# Patient Record
Sex: MALE | Race: WHITE | NOT HISPANIC OR LATINO | URBAN - METROPOLITAN AREA
[De-identification: names, ages, dates, MRNs, and addresses within clinical notes are randomized per-mention and may not be internally consistent; named-entity substitution may affect disease eponyms.]

---

## 2017-03-17 ENCOUNTER — OUTPATIENT (OUTPATIENT)
Dept: OUTPATIENT SERVICES | Facility: HOSPITAL | Age: 52
LOS: 1 days | Discharge: HOME | End: 2017-03-17

## 2017-06-27 DIAGNOSIS — D57.1 SICKLE-CELL DISEASE WITHOUT CRISIS: ICD-10-CM

## 2017-06-27 DIAGNOSIS — E11.9 TYPE 2 DIABETES MELLITUS WITHOUT COMPLICATIONS: ICD-10-CM

## 2017-06-27 DIAGNOSIS — Z12.5 ENCOUNTER FOR SCREENING FOR MALIGNANT NEOPLASM OF PROSTATE: ICD-10-CM

## 2017-06-27 DIAGNOSIS — M06.9 RHEUMATOID ARTHRITIS, UNSPECIFIED: ICD-10-CM

## 2017-06-27 DIAGNOSIS — D64.9 ANEMIA, UNSPECIFIED: ICD-10-CM

## 2017-06-27 DIAGNOSIS — Z01.84 ENCOUNTER FOR ANTIBODY RESPONSE EXAMINATION: ICD-10-CM

## 2017-06-27 DIAGNOSIS — N39.0 URINARY TRACT INFECTION, SITE NOT SPECIFIED: ICD-10-CM

## 2017-06-27 DIAGNOSIS — E55.9 VITAMIN D DEFICIENCY, UNSPECIFIED: ICD-10-CM

## 2017-06-27 DIAGNOSIS — I25.10 ATHEROSCLEROTIC HEART DISEASE OF NATIVE CORONARY ARTERY WITHOUT ANGINA PECTORIS: ICD-10-CM

## 2017-06-27 DIAGNOSIS — D50.9 IRON DEFICIENCY ANEMIA, UNSPECIFIED: ICD-10-CM

## 2019-10-08 ENCOUNTER — APPOINTMENT (OUTPATIENT)
Dept: NEUROLOGY | Facility: CLINIC | Age: 54
End: 2019-10-08
Payer: COMMERCIAL

## 2019-10-08 VITALS
WEIGHT: 175 LBS | BODY MASS INDEX: 24.5 KG/M2 | HEART RATE: 67 BPM | HEIGHT: 71 IN | DIASTOLIC BLOOD PRESSURE: 91 MMHG | SYSTOLIC BLOOD PRESSURE: 144 MMHG

## 2019-10-08 DIAGNOSIS — Z83.3 FAMILY HISTORY OF DIABETES MELLITUS: ICD-10-CM

## 2019-10-08 DIAGNOSIS — R20.2 ANESTHESIA OF SKIN: ICD-10-CM

## 2019-10-08 DIAGNOSIS — Z82.49 FAMILY HISTORY OF ISCHEMIC HEART DISEASE AND OTHER DISEASES OF THE CIRCULATORY SYSTEM: ICD-10-CM

## 2019-10-08 DIAGNOSIS — R20.0 ANESTHESIA OF SKIN: ICD-10-CM

## 2019-10-08 PROCEDURE — 99213 OFFICE O/P EST LOW 20 MIN: CPT

## 2019-10-08 NOTE — ASSESSMENT
[FreeTextEntry1] : Multiple sclerosis, stable.  He still does not wish to take any disease modifying therapy contending he has probably had MS for 25 years and only a few exacerbations.  He felt worse on treatment with copaxone.  He does not wish to try any new disease modifying therapy at this time.  I encouraged him to continue Vitamin D.  I referred him to urology for new erectile dysfunction x 6 months.  Also EMG/NCS left arm and leg due to stocking glove sensory loss that may be due to neuropathy as opposed to MS.

## 2019-10-08 NOTE — HISTORY OF PRESENT ILLNESS
[FreeTextEntry1] : Pt is 54 yom with multiple sclerosis diagnosed about 5 years ago.  Had been on copaxone but had reactions after a year on it - shakiness.  Never tried oral MS medication.  He states told he probably had MS since 30 or 31.  Once he got off Copaxone he felt a lot better.  Has only had 3-4 flares since his 20's. \par Only new symptoms are more numbness in his legs, doesn't feel cat fur when cat rubs up against skin.\par Denies falling or tripping.  Forgets important things remembers trivial things.

## 2019-10-08 NOTE — PHYSICAL EXAM
[FreeTextEntry1] : 5/5 strength upper and lower extremiites.\par EOM's and facial muscles intact.\par FTN and HTS intact.\par Sensory:  stocking glove sensory loss bilaterally.\par ambulates w/o assistance though unsteady tandem.

## 2019-11-12 ENCOUNTER — EMERGENCY (EMERGENCY)
Facility: HOSPITAL | Age: 54
LOS: 0 days | Discharge: HOME | End: 2019-11-12
Attending: EMERGENCY MEDICINE | Admitting: EMERGENCY MEDICINE
Payer: COMMERCIAL

## 2019-11-12 VITALS
RESPIRATION RATE: 19 BRPM | HEART RATE: 70 BPM | DIASTOLIC BLOOD PRESSURE: 73 MMHG | OXYGEN SATURATION: 97 % | TEMPERATURE: 98 F | SYSTOLIC BLOOD PRESSURE: 140 MMHG

## 2019-11-12 DIAGNOSIS — F17.200 NICOTINE DEPENDENCE, UNSPECIFIED, UNCOMPLICATED: ICD-10-CM

## 2019-11-12 DIAGNOSIS — R07.89 OTHER CHEST PAIN: ICD-10-CM

## 2019-11-12 DIAGNOSIS — R11.0 NAUSEA: ICD-10-CM

## 2019-11-12 DIAGNOSIS — R42 DIZZINESS AND GIDDINESS: ICD-10-CM

## 2019-11-12 DIAGNOSIS — R07.9 CHEST PAIN, UNSPECIFIED: ICD-10-CM

## 2019-11-12 LAB
ALBUMIN SERPL ELPH-MCNC: 4.1 G/DL — SIGNIFICANT CHANGE UP (ref 3.5–5.2)
ALP SERPL-CCNC: 81 U/L — SIGNIFICANT CHANGE UP (ref 30–115)
ALT FLD-CCNC: 17 U/L — SIGNIFICANT CHANGE UP (ref 0–41)
ANION GAP SERPL CALC-SCNC: 16 MMOL/L — HIGH (ref 7–14)
AST SERPL-CCNC: 17 U/L — SIGNIFICANT CHANGE UP (ref 0–41)
BILIRUB SERPL-MCNC: <0.2 MG/DL — SIGNIFICANT CHANGE UP (ref 0.2–1.2)
BUN SERPL-MCNC: 10 MG/DL — SIGNIFICANT CHANGE UP (ref 10–20)
CALCIUM SERPL-MCNC: 9.5 MG/DL — SIGNIFICANT CHANGE UP (ref 8.5–10.1)
CHLORIDE SERPL-SCNC: 103 MMOL/L — SIGNIFICANT CHANGE UP (ref 98–110)
CO2 SERPL-SCNC: 24 MMOL/L — SIGNIFICANT CHANGE UP (ref 17–32)
CREAT SERPL-MCNC: 1.2 MG/DL — SIGNIFICANT CHANGE UP (ref 0.7–1.5)
GLUCOSE SERPL-MCNC: 107 MG/DL — HIGH (ref 70–99)
HCT VFR BLD CALC: 41.7 % — LOW (ref 42–52)
HGB BLD-MCNC: 13.3 G/DL — LOW (ref 14–18)
MCHC RBC-ENTMCNC: 29.2 PG — SIGNIFICANT CHANGE UP (ref 27–31)
MCHC RBC-ENTMCNC: 31.9 G/DL — LOW (ref 32–37)
MCV RBC AUTO: 91.4 FL — SIGNIFICANT CHANGE UP (ref 80–94)
NRBC # BLD: 0 /100 WBCS — SIGNIFICANT CHANGE UP (ref 0–0)
PLATELET # BLD AUTO: 235 K/UL — SIGNIFICANT CHANGE UP (ref 130–400)
POTASSIUM SERPL-MCNC: 4.5 MMOL/L — SIGNIFICANT CHANGE UP (ref 3.5–5)
POTASSIUM SERPL-SCNC: 4.5 MMOL/L — SIGNIFICANT CHANGE UP (ref 3.5–5)
PROT SERPL-MCNC: 6.8 G/DL — SIGNIFICANT CHANGE UP (ref 6–8)
RBC # BLD: 4.56 M/UL — LOW (ref 4.7–6.1)
RBC # FLD: 13.5 % — SIGNIFICANT CHANGE UP (ref 11.5–14.5)
SODIUM SERPL-SCNC: 143 MMOL/L — SIGNIFICANT CHANGE UP (ref 135–146)
TROPONIN T SERPL-MCNC: <0.01 NG/ML — SIGNIFICANT CHANGE UP
WBC # BLD: 11.47 K/UL — HIGH (ref 4.8–10.8)
WBC # FLD AUTO: 11.47 K/UL — HIGH (ref 4.8–10.8)

## 2019-11-12 PROCEDURE — 99284 EMERGENCY DEPT VISIT MOD MDM: CPT

## 2019-11-12 PROCEDURE — 93010 ELECTROCARDIOGRAM REPORT: CPT

## 2019-11-12 PROCEDURE — 71046 X-RAY EXAM CHEST 2 VIEWS: CPT | Mod: 26

## 2019-11-12 RX ORDER — ASPIRIN/CALCIUM CARB/MAGNESIUM 324 MG
324 TABLET ORAL ONCE
Refills: 0 | Status: COMPLETED | OUTPATIENT
Start: 2019-11-12 | End: 2019-11-12

## 2019-11-12 RX ADMIN — Medication 324 MILLIGRAM(S): at 14:03

## 2019-11-12 NOTE — ED PROVIDER NOTE - PATIENT PORTAL LINK FT
You can access the FollowMyHealth Patient Portal offered by [facility] by registering at the following website: [email or web address]. By joining [facility]’s FollowMyHealth portal, you will also be able to view your health information using other applications (apps) compatible with our system.

## 2019-11-12 NOTE — ED PROVIDER NOTE - CARE PROVIDER_API CALL
Viji Mora)  Cardiology; Internal Medicine; Nuclear Cardiology  [address] [place] [address], [place] [address]  Phone: [phone]  Fax: [phone]  Follow Up Time: 4-6 Days

## 2019-11-12 NOTE — ED PROVIDER NOTE - CLINICAL SUMMARY MEDICAL DECISION MAKING FREE TEXT BOX
53 yo man with concerning typical chest pain with no recent workup in 3 years.  Smokes and multiple cardiac risk factors.  Despite my discouragement, left AMA.  Will follow up with Dr. Mora or return for any new or worsening symptoms.

## 2019-11-12 NOTE — ED ADULT TRIAGE NOTE - CHIEF COMPLAINT QUOTE
" I'm having left side chest pain that started an hour ago " EMS states patient was hyperventilating upon transport

## 2019-11-12 NOTE — ED PROVIDER NOTE - OBJECTIVE STATEMENT
54yM with PMH htn, hld, anxiety, L shoulder bursitis, +smoker, MS, p/w L cp X 1 h pta occurring at rest, sudden onset now resolved a/w lightheadedness and nausea. radiation to L jaw and L shoulder. cardiologist Dr. Mora. no fever chills vomiting diarrhea abd pain urinary sx muscle aches myalgia back pain leg swelling, steroid use, pleurisy or sob.

## 2019-11-12 NOTE — ED PROVIDER NOTE - PHYSICAL EXAMINATION
Vital Signs: I have reviewed the initial vital signs.  Constitutional: NAD, well-nourished, appears stated age, no acute distress.  HEENT: Airway patent, moist MM, no erythema/swelling/deformity of oral structures. EOMI, PERRLA.  CV: regular rate, regular rhythm, well-perfused extremities, 2+ b/l DP and radial pulses equal.  Lungs: BCTA, no increased WOB.  ABD: NTND, no guarding or rebound, no pulsatile mass, no hernias.   MSK: Neck supple, nontender, nl ROM, no stepoff. Chest nontender. Back nontender in TLS spine or to b/l bony structures or flanks. Ext nontender, nl rom, no deformity.   INTEG: Skin warm, dry, no rash.  NEURO: A&Ox3, moving all extremities, normal speech. cn 2-12 intact normal strength and cerebellar  PSYCH: Calm, cooperative, normal affect and interaction.

## 2019-11-12 NOTE — ED PROVIDER NOTE - NS ED ROS FT
Eyes:  No visual changes, eye pain or discharge.  ENMT:  No hearing changes, pain, no sore throat or runny nose, no difficulty swallowing  Cardiac:  +chest pain, no SOB or edema.  Respiratory:  No cough or respiratory distress.    GI:  +nausea, no vomiting, diarrhea or abdominal pain.  :  No dysuria, frequency or burning.  MS:  No myalgia, muscle weakness, joint pain or back pain.  Neuro:  No headache or weakness.  No LOC.  Skin:  No skin rash.   Endocrine: No history of thyroid disease or diabetes.

## 2019-11-12 NOTE — ED PROVIDER NOTE - ATTENDING CONTRIBUTION TO CARE
I personally evaluated the patient. I reviewed the Resident’s or Physician Assistant’s note (as assigned above), and agree with the findings and plan except as documented in my note.  55 yo man with left sided chest pain with radiation ot left arm associated with some nausea and light headedness.  Radiation to jaw and left shoulder.  No exacerbating or alleviating factors.  Lasted about 1 hour.  Currently pain free.  Quality was pressure and about 8/10 in intensity.  EKG ok.  Labs ok.  Discussed with patient at length my concern for discharge without further cardiac testing.  Despite my recommendations, wants to leave AMA.  Full capacity.  The patient wishes to leave against medical advice.  I have discussed the risks, benefits and alternatives (including the possibility of worsening of disease, pain, permanent disability, and/or death) with the patient and his/her family (if available).  The patient voices understanding of these risks, benefits, and alternatives and still wishes to sign out against medical advice.  The patient is awake, alert, oriented  x 3 and has demonstrated capacity to refuse/direct care.  I have advised the patient that they can and should return immediately should they develop any worse/different/additional symptoms, or if they change their mind and want to continue their care.

## 2019-11-13 PROBLEM — G35 MULTIPLE SCLEROSIS: Chronic | Status: ACTIVE | Noted: 2019-11-12

## 2019-11-19 ENCOUNTER — APPOINTMENT (OUTPATIENT)
Dept: NEUROLOGY | Facility: CLINIC | Age: 54
End: 2019-11-19
Payer: COMMERCIAL

## 2019-11-19 ENCOUNTER — OTHER (OUTPATIENT)
Age: 54
End: 2019-11-19

## 2019-11-19 VITALS
BODY MASS INDEX: 24.5 KG/M2 | WEIGHT: 175 LBS | TEMPERATURE: 97.7 F | SYSTOLIC BLOOD PRESSURE: 139 MMHG | HEIGHT: 71 IN | OXYGEN SATURATION: 98 % | DIASTOLIC BLOOD PRESSURE: 91 MMHG | HEART RATE: 69 BPM

## 2019-11-19 DIAGNOSIS — M62.838 OTHER MUSCLE SPASM: ICD-10-CM

## 2019-11-19 PROCEDURE — 99214 OFFICE O/P EST MOD 30 MIN: CPT

## 2019-11-25 ENCOUNTER — APPOINTMENT (OUTPATIENT)
Dept: VASCULAR SURGERY | Facility: CLINIC | Age: 54
End: 2019-11-25
Payer: COMMERCIAL

## 2019-11-25 VITALS
SYSTOLIC BLOOD PRESSURE: 130 MMHG | HEIGHT: 71 IN | WEIGHT: 175 LBS | BODY MASS INDEX: 24.5 KG/M2 | DIASTOLIC BLOOD PRESSURE: 80 MMHG

## 2019-11-25 DIAGNOSIS — Z80.9 FAMILY HISTORY OF MALIGNANT NEOPLASM, UNSPECIFIED: ICD-10-CM

## 2019-11-25 PROCEDURE — 93925 LOWER EXTREMITY STUDY: CPT

## 2019-11-25 PROCEDURE — 99203 OFFICE O/P NEW LOW 30 MIN: CPT

## 2019-11-25 NOTE — ASSESSMENT
[FreeTextEntry1] : 53 y/o gentleman with h/o multiple sclerosis, h/o neuropathy in the feet, presents for evaluation of PVD. He has had right foot pain for the past 4 months, had PVRs done showed low TYESHA and was sent here. He denies any trauma, improves with Advil, worse with ambulation.\par I performed an arterial duplex which was medically necessary to evaluate for arterial disease. It showed patent femoral, popliteal and tibial arteries bilaterally.\par I have informed him of the test results and advised consultation with Podiatry. No vascular surgical intervention is needed and he can follow up as needed.

## 2019-11-25 NOTE — ASSESSMENT
[FreeTextEntry1] : Patient is a 54 year old man with MS, HTN, HLD, anxiety.  MS stable.  He states he has MS for 25 years with only a few exacerbations.  He does not wish to try disease modifying treatment at this time.  He tried Copaxone in the past but had shakiness.  He decided to stop Copaxone and felt better.  \par \par \par Plan \par 1. Start Gabapentin 300 mg TID for neuropathy\par 2. Start Baclofen 10 mg BID for muscle spasm\par 3. Return for EMG in two months

## 2019-11-25 NOTE — HISTORY OF PRESENT ILLNESS
[FreeTextEntry1] : Patient is a 54 year old man with multiple sclerosis, htn, hld, anxiety and left shoulder bursitis who presented to [facility] for chest pain.   Patient reports he felt like he had a band around his chest and presented to [facility] for evaluation.  Patient had a negative cardiac work up in the hospital. \par \par Patient reports he no longer has sensation of band wrapped around chest.  He is going to see a vascular doctor on Monday and will have Doppler of his right leg to evaluate for a DVT.  Patient reports numbness of bilateral feet.  Patient does not have tenderness of right leg but has numbness and pain of toes and reports it started a couple of months ago.  \par \par

## 2019-11-25 NOTE — DATA REVIEWED
[FreeTextEntry1] : I performed an arterial duplex which was medically necessary to evaluate for arterial disease. It showed patent femoral, popliteal and tibial arteries bilaterally.\par

## 2019-11-25 NOTE — HISTORY OF PRESENT ILLNESS
[FreeTextEntry1] : 53 y/o gentleman with h/o multiple sclerosis, h/o neuropathy in the feet, presents for evaluation of PVD. He has had right foot pain for the past 4 months, had PVRs done showed low TYESHA and was sent here. He denies any trauma, improves with Advil, worse with ambulation.

## 2019-11-25 NOTE — CONSULT LETTER
[Dear  ___] : Dear  [unfilled], [Consult Letter:] : I had the pleasure of evaluating your patient, [unfilled]. [Please see my note below.] : Please see my note below. [FreeTextEntry2] : Dear Dr. Jones,

## 2019-12-02 ENCOUNTER — OUTPATIENT (OUTPATIENT)
Dept: OUTPATIENT SERVICES | Facility: HOSPITAL | Age: 54
LOS: 1 days | Discharge: HOME | End: 2019-12-02
Payer: COMMERCIAL

## 2019-12-02 DIAGNOSIS — M79.671 PAIN IN RIGHT FOOT: ICD-10-CM

## 2019-12-02 DIAGNOSIS — R07.9 CHEST PAIN, UNSPECIFIED: ICD-10-CM

## 2019-12-02 DIAGNOSIS — G35 MULTIPLE SCLEROSIS: ICD-10-CM

## 2019-12-02 PROCEDURE — 73630 X-RAY EXAM OF FOOT: CPT | Mod: 26,RT

## 2019-12-02 PROCEDURE — 75574 CT ANGIO HRT W/3D IMAGE: CPT | Mod: 26

## 2019-12-10 ENCOUNTER — OUTPATIENT (OUTPATIENT)
Dept: OUTPATIENT SERVICES | Facility: HOSPITAL | Age: 54
LOS: 1 days | Discharge: HOME | End: 2019-12-10

## 2019-12-10 DIAGNOSIS — D68.9 COAGULATION DEFECT, UNSPECIFIED: ICD-10-CM

## 2019-12-10 DIAGNOSIS — I25.10 ATHEROSCLEROTIC HEART DISEASE OF NATIVE CORONARY ARTERY WITHOUT ANGINA PECTORIS: ICD-10-CM

## 2019-12-10 DIAGNOSIS — Z01.810 ENCOUNTER FOR PREPROCEDURAL CARDIOVASCULAR EXAMINATION: ICD-10-CM

## 2019-12-12 ENCOUNTER — OUTPATIENT (OUTPATIENT)
Dept: OUTPATIENT SERVICES | Facility: HOSPITAL | Age: 54
LOS: 1 days | Discharge: HOME | End: 2019-12-12

## 2019-12-12 DIAGNOSIS — R07.9 CHEST PAIN, UNSPECIFIED: ICD-10-CM

## 2019-12-12 DIAGNOSIS — Z90.49 ACQUIRED ABSENCE OF OTHER SPECIFIED PARTS OF DIGESTIVE TRACT: Chronic | ICD-10-CM

## 2019-12-12 NOTE — H&P CARDIOLOGY - ATTENDING COMMENTS
smoker with HTN and high lipid and recently onset chest pain, arm pain, had a coronary CTA and found to have proximal and mid soft plaques in the LAD, motion artifact that radiologist could not properly assess the severity of the stenosis but suggested at least moderate. patient used to take Atorvastatin and Lovaza but stopped both, currently has been using Toprol   plan is cardiac cath today

## 2019-12-12 NOTE — CHART NOTE - NSCHARTNOTEFT_GEN_A_CORE
Preliminary Cardiac Catheterization Post-Procedure Report:12-12-19 @ 10:19    Procedure Performed:  [x] Left Heart Catheterization  [ ] Right Heart Catheterization  [ ] Percutaneous Coronary Intervention    Primary Physician: Dr. Morgan MD  Assistant(s): Dr. Dariusz MD    Preliminary Procedure Summary (Official full report to follow)    Pre-procedure diagnosis: unstable angina, abnormal CCTA  Post Procedure Diagnosis/Impression: non-obstructive CAD     Left Heart Catheterization:  approximate EF%: 50%  [ ] Normal Coronary Arteries  [ ] Luminal Irregularities  [x] non-obstructive CAD  [ ] ** vessel coronary artery disease       Anesthesia Type  [x] conscious sedation  [x] local/regional anesthesia  [  ] general anesthesia    Estimated Blood Loss  [x ] less than 30 ml    Amount of Contrast used: 80 ml    Access  [x] Rt. Femoral A (Angioseal)  [ ] Rt. Femoral V  [ ] Rt. Radial A  [ ] Rt. Brachial V    Condition of patient after procedure  [x] stable  [  ] guarded  [  ] satisfactory     CATH SUMMARY/FINDINGS:    Dominance:   [x] Right  [ ] Left                  LM: normal       LAD:            prox LAD: 20-30% tubular stenosis              mid LAD: normal  distal LAD: normal    Diag:   D1: large vessel, normal  Cx: normal  OM1: normal  RCA:  prox RCA normal  mid RCA: mild atherosclerosis  distal RCA: normal    Ramus: large vessel, normal    RPL: normal  RPDA: normal      Follow-up Care:  [x] D/C Home today  [x] ASA 81mg, statin, BB  [x] intensive medical management  Monitor BUN/Cr  NS@100cc/hr x 4 hours

## 2019-12-12 NOTE — H&P CARDIOLOGY - HISTORY OF PRESENT ILLNESS
53 YO M with PMH of HTN, DLD, current smoker, and strong family history of CAD here for elective LHC after recent ER visit for anginal chest pain/UA, and a recent CCTA that showed 50% lesion (soft plaque) in prox LAD. Denies any active chest pain, sob, palpitations.                                             PREOPERATIVE DAY OF PROCEDURE EVALUATION:  I have personally seen and examined the patient.  I agree with the history and physical which I have reviewed and noted any changes below.  (Signed electronically by Viji Mora)  12-12-19 @ 09:23

## 2019-12-18 DIAGNOSIS — I20.0 UNSTABLE ANGINA: ICD-10-CM

## 2019-12-18 DIAGNOSIS — I25.110 ATHEROSCLEROTIC HEART DISEASE OF NATIVE CORONARY ARTERY WITH UNSTABLE ANGINA PECTORIS: ICD-10-CM

## 2019-12-18 DIAGNOSIS — Z82.49 FAMILY HISTORY OF ISCHEMIC HEART DISEASE AND OTHER DISEASES OF THE CIRCULATORY SYSTEM: ICD-10-CM

## 2019-12-18 DIAGNOSIS — I10 ESSENTIAL (PRIMARY) HYPERTENSION: ICD-10-CM

## 2019-12-18 DIAGNOSIS — G35 MULTIPLE SCLEROSIS: ICD-10-CM

## 2019-12-18 DIAGNOSIS — F17.210 NICOTINE DEPENDENCE, CIGARETTES, UNCOMPLICATED: ICD-10-CM

## 2019-12-18 DIAGNOSIS — E78.49 OTHER HYPERLIPIDEMIA: ICD-10-CM

## 2020-01-03 ENCOUNTER — APPOINTMENT (OUTPATIENT)
Dept: NEUROLOGY | Facility: CLINIC | Age: 55
End: 2020-01-03

## 2020-07-22 PROBLEM — I10 ESSENTIAL (PRIMARY) HYPERTENSION: Chronic | Status: ACTIVE | Noted: 2019-12-12

## 2020-07-22 PROBLEM — E78.5 HYPERLIPIDEMIA, UNSPECIFIED: Chronic | Status: ACTIVE | Noted: 2019-12-12

## 2020-08-07 ENCOUNTER — APPOINTMENT (OUTPATIENT)
Dept: NEUROLOGY | Facility: CLINIC | Age: 55
End: 2020-08-07
Payer: COMMERCIAL

## 2020-08-07 DIAGNOSIS — N52.9 MALE ERECTILE DYSFUNCTION, UNSPECIFIED: ICD-10-CM

## 2020-08-07 PROCEDURE — 99213 OFFICE O/P EST LOW 20 MIN: CPT | Mod: 95

## 2020-08-07 RX ORDER — GABAPENTIN 300 MG/1
300 CAPSULE ORAL 3 TIMES DAILY
Qty: 90 | Refills: 0 | Status: DISCONTINUED | COMMUNITY
Start: 2019-11-19 | End: 2020-08-07

## 2020-08-07 RX ORDER — ERGOCALCIFEROL (VITAMIN D2) 1250 MCG
50000 CAPSULE ORAL
Refills: 0 | Status: ACTIVE | COMMUNITY

## 2020-08-07 NOTE — HISTORY OF PRESENT ILLNESS
[FreeTextEntry1] : Start of visit 2:48.  End of visit 3:01 pm.  Duration 13 min.\par \par Pt with RR MS.  Has been feeling usual pain and tired feelings and lightheaded.  Hasn't been on Copaxone for 3-4 years. \par \par November had MS hug, cardiac w/u was negative.  Triggered a panic attack.  No sxs' of that since January.  has control over bowel and bladder.  Walking is okay.  Some arthrititis.  Taking Vit D supplement.  Fatigue level good and bad days.  Has numbness in his feet.  Numb, tingling and pain.  \par

## 2020-08-07 NOTE — REASON FOR VISIT
[Home] : at home, [unfilled] , at the time of the visit. [Medical Office: ([facility])___] : at the medical office located in  [Verbal consent obtained from patient] : the patient, [unfilled]

## 2020-08-07 NOTE — ASSESSMENT
[FreeTextEntry1] : Relapsing remitting multiple sclerosis.  Last flare was MS himanshug in November.  Cardiac w/u negative.\par Doing relatively well.  Doesn't want disease modifying therapy at this time.  Last MRI brain he states about 2 years ago.   Last visit planned EMG/NCS and Urology referral but never completed.  Will reorder.\par \par Return in 6 months.\par Continue Vit D supplements (50,000 units [id number])\par Script for amantadine 100 mg po bid for fatigue related to MS.\par Continue baclofen prn for spasticity.

## 2020-08-07 NOTE — PHYSICAL EXAM
[FreeTextEntry1] : speech is fluent, falcial muscles are symmetric.  no dysarthria.\par balance off.  \par Holds hands above head and in front w/o drift.\par Able to flex knees to abomen.\par

## 2020-09-30 ENCOUNTER — APPOINTMENT (OUTPATIENT)
Dept: NEUROLOGY | Facility: CLINIC | Age: 55
End: 2020-09-30

## 2022-09-23 ENCOUNTER — APPOINTMENT (OUTPATIENT)
Dept: CARDIOLOGY | Facility: CLINIC | Age: 57
End: 2022-09-23

## 2022-11-04 ENCOUNTER — APPOINTMENT (OUTPATIENT)
Dept: CARDIOLOGY | Facility: CLINIC | Age: 57
End: 2022-11-04

## 2022-11-04 VITALS
DIASTOLIC BLOOD PRESSURE: 80 MMHG | HEIGHT: 71 IN | HEART RATE: 61 BPM | WEIGHT: 188.8 LBS | SYSTOLIC BLOOD PRESSURE: 138 MMHG | BODY MASS INDEX: 26.43 KG/M2 | OXYGEN SATURATION: 98 %

## 2022-11-04 PROCEDURE — 99204 OFFICE O/P NEW MOD 45 MIN: CPT

## 2022-11-04 RX ORDER — AMANTADINE HYDROCHLORIDE 100 1/1
100 TABLET ORAL
Qty: 270 | Refills: 3 | Status: DISCONTINUED | COMMUNITY
Start: 2020-08-07 | End: 2022-11-04

## 2022-11-04 RX ORDER — BACLOFEN 10 MG/1
10 TABLET ORAL TWICE DAILY
Qty: 90 | Refills: 0 | Status: DISCONTINUED | COMMUNITY
Start: 2019-11-19 | End: 2022-11-04

## 2022-11-04 NOTE — HISTORY OF PRESENT ILLNESS
[FreeTextEntry1] : 57 Yr M PMH MS, HTN, dyslipidemia presenting for initial eval for LE pain. Pt reports episodes b/l LE calf pain on exertion. Pt reports sometimes pain doesn’t happen even when walking long distance. Pt had arterial duplex with PMD which shows mild to moderate distal PVD. Pt is a current smoker for over 30 years.\par Pt denies any chest pain, sob, palpitations, LE ulcers.

## 2022-11-04 NOTE — PHYSICAL EXAM
[General Appearance - Well Developed] : well developed [Normal Appearance] : normal appearance [Heart Rate And Rhythm] : heart rate and rhythm were normal [Heart Sounds] : normal S1 and S2 [Murmurs] : no murmurs present [Respiration, Rhythm And Depth] : normal respiratory rhythm and effort [Auscultation Breath Sounds / Voice Sounds] : lungs were clear to auscultation bilaterally [Bowel Sounds] : normal bowel sounds [Abdomen Soft] : soft [Abnormal Walk] : normal gait [Nail Clubbing] : no clubbing of the fingernails [Cyanosis, Localized] : no localized cyanosis [] : no rash [Oriented To Time, Place, And Person] : oriented to person, place, and time [Normal Oral Mucosa] : normal oral mucosa

## 2022-11-04 NOTE — ASSESSMENT
[FreeTextEntry1] : 57 Yr M PMH MS, HTN, dyslipidemia presenting for initial eval for LE pain.\par \par Atypical LE pain\par Hx MS, HTN, Smoker and dyslipidemia\par \par - Will need TYESHA w PVR\par - Arterial duplex b/l LE\par - Follow up in 4 weeks\par \par I, Dr. Umana, personally performed the evaluation and management (E/M) services for this new patient. That E/M includes conducting the clinically appropriate initial history &/or exam, assessing all conditions, and establishing the plan of care. Today, my fellow Dr. White was here to observe &/or participate in the visit & follow plan of care established by me.\par

## 2022-12-29 ENCOUNTER — APPOINTMENT (OUTPATIENT)
Dept: CARDIOLOGY | Facility: CLINIC | Age: 57
End: 2022-12-29

## 2023-01-06 ENCOUNTER — APPOINTMENT (OUTPATIENT)
Dept: CARDIOLOGY | Facility: CLINIC | Age: 58
End: 2023-01-06

## 2023-02-17 ENCOUNTER — APPOINTMENT (OUTPATIENT)
Dept: CARDIOLOGY | Facility: CLINIC | Age: 58
End: 2023-02-17
Payer: COMMERCIAL

## 2023-02-17 PROCEDURE — 93925 LOWER EXTREMITY STUDY: CPT

## 2023-02-17 PROCEDURE — 93923 UPR/LXTR ART STDY 3+ LVLS: CPT

## 2023-04-20 ENCOUNTER — RESULT REVIEW (OUTPATIENT)
Age: 58
End: 2023-04-20

## 2023-04-20 ENCOUNTER — OUTPATIENT (OUTPATIENT)
Dept: OUTPATIENT SERVICES | Facility: HOSPITAL | Age: 58
LOS: 1 days | End: 2023-04-20
Payer: COMMERCIAL

## 2023-04-20 DIAGNOSIS — Z00.8 ENCOUNTER FOR OTHER GENERAL EXAMINATION: ICD-10-CM

## 2023-04-20 DIAGNOSIS — I70.213 ATHEROSCLEROSIS OF NATIVE ARTERIES OF EXTREMITIES WITH INTERMITTENT CLAUDICATION, BILATERAL LEGS: ICD-10-CM

## 2023-04-20 DIAGNOSIS — Z90.49 ACQUIRED ABSENCE OF OTHER SPECIFIED PARTS OF DIGESTIVE TRACT: Chronic | ICD-10-CM

## 2023-04-20 PROCEDURE — 75635 CT ANGIO ABDOMINAL ARTERIES: CPT

## 2023-04-20 PROCEDURE — 75635 CT ANGIO ABDOMINAL ARTERIES: CPT | Mod: 26

## 2023-04-21 DIAGNOSIS — I70.213 ATHEROSCLEROSIS OF NATIVE ARTERIES OF EXTREMITIES WITH INTERMITTENT CLAUDICATION, BILATERAL LEGS: ICD-10-CM

## 2023-04-25 DIAGNOSIS — R20.0 ANESTHESIA OF SKIN: ICD-10-CM

## 2023-04-25 DIAGNOSIS — I73.9 PERIPHERAL VASCULAR DISEASE, UNSPECIFIED: ICD-10-CM

## 2023-04-25 DIAGNOSIS — G57.93 UNSPECIFIED MONONEUROPATHY OF BILATERAL LOWER LIMBS: ICD-10-CM

## 2023-04-28 NOTE — ASU PATIENT PROFILE, ADULT - PATIENT KNOW
yes Complex Repair And Double Advancement Flap Text: The defect edges were debeveled with a #15 scalpel blade.  The primary defect was closed partially with a complex linear closure.  Given the location of the remaining defect, shape of the defect and the proximity to free margins a double advancement flap was deemed most appropriate for complete closure of the defect.  Using a sterile surgical marker, an appropriate advancement flap was drawn incorporating the defect and placing the expected incisions within the relaxed skin tension lines where possible.    The area thus outlined was incised deep to adipose tissue with a #15 scalpel blade.  The skin margins were undermined to an appropriate distance in all directions utilizing iris scissors.

## 2023-05-02 ENCOUNTER — APPOINTMENT (OUTPATIENT)
Dept: SURGERY | Facility: CLINIC | Age: 58
End: 2023-05-02
Payer: COMMERCIAL

## 2023-05-02 VITALS — BODY MASS INDEX: 25.2 KG/M2 | WEIGHT: 180 LBS | HEIGHT: 71 IN

## 2023-05-02 DIAGNOSIS — F17.200 NICOTINE DEPENDENCE, UNSPECIFIED, UNCOMPLICATED: ICD-10-CM

## 2023-05-02 DIAGNOSIS — G35 MULTIPLE SCLEROSIS: ICD-10-CM

## 2023-05-02 PROCEDURE — 99213 OFFICE O/P EST LOW 20 MIN: CPT

## 2023-05-02 PROCEDURE — 99203 OFFICE O/P NEW LOW 30 MIN: CPT

## 2023-05-02 NOTE — ASSESSMENT
[FreeTextEntry1] : Jeff is a pleasant 58-year-old retired and  gentleman with a past medical history significant for hypertension and hypercholesterolemia, chronic constipation, cigarette smoking (currently vaping and his last cigarette was 10 days ago), and multiple sclerosis (not on medication) who presents with pain and swelling in the right groin suspicious for hernia.  He has bilateral lower extremity atherosclerosis and claudication and the plan is for angiography and possible angioplasty/stent which would require postprocedure antiplatelet medication and his primary care physician felt this hernia should be addressed first and I tend to agree.\par \par Physical examination demonstrates a plum sized tender bulge in the right groin which is reducible with minimal to moderate difficulty consistent with a large protruding symptomatic right inguinal hernia warranting surgical repair.  There is no evidence of incarceration or strangulation, and the patient denies any symptoms of obstruction.  Examination of his left groin demonstrates a moderate to significant weakness with no evidence of a true hernia at this time.  Both testicles are normal.  His umbilical examination is unremarkable.  His current BMI is 25.\par \par I explained the pros and cons of surgery, as well as all risks, benefits, indications and alternatives of the procedure and the patient understood and agreed.  Jeff would prefer to defer surgical intervention until late June.  He was scheduled for the repair of his right inguinal hernia with mesh on Friday, June 2, 2023 under LOCAL with IV SED[facility] at [facility] with presurgical testing preceding this date. He was encouraged to avoid heavy lifting and strenuous activity in the interim, of course.\par \par After our discussion, he is aware of the dangers of cigarette smoking and vaping, especially with regard to wound healing, wound complications, hernia development and hernia recurrence.  He was encouraged to quit or minimize smoking in the perioperative period and has agreed to try.

## 2023-05-02 NOTE — PHYSICAL EXAM
[Normal Breath Sounds] : Normal breath sounds [No Rash or Lesion] : No rash or lesion [Alert] : alert [Calm] : calm [JVD] : no jugular venous distention  [de-identified] : Healthy [de-identified] : Normal [de-identified] : Soft and flat abdomen [de-identified] : Normal testicles [de-identified] : Right inguinal hernia, reducible

## 2023-05-02 NOTE — CONSULT LETTER
[Dear  ___] : Dear  [unfilled], [Courtesy Letter:] : I had the pleasure of seeing your patient, [unfilled], in my office today. [Please see my note below.] : Please see my note below. [Consult Closing:] : Thank you very much for allowing me to participate in the care of this patient.  If you have any questions, please do not hesitate to contact me. [DrEusebio  ___] : Dr. WILLARD [DrEusebio ___] : Dr. WILLADR [FreeTextEntry3] : Respectfully,\par \par Ren Medley M.D., FACS\par

## 2023-05-19 ENCOUNTER — RESULT REVIEW (OUTPATIENT)
Age: 58
End: 2023-05-19

## 2023-05-19 ENCOUNTER — OUTPATIENT (OUTPATIENT)
Dept: OUTPATIENT SERVICES | Facility: HOSPITAL | Age: 58
LOS: 1 days | End: 2023-05-19
Payer: COMMERCIAL

## 2023-05-19 VITALS
TEMPERATURE: 99 F | WEIGHT: 181.88 LBS | RESPIRATION RATE: 15 BRPM | HEART RATE: 67 BPM | DIASTOLIC BLOOD PRESSURE: 78 MMHG | SYSTOLIC BLOOD PRESSURE: 139 MMHG | OXYGEN SATURATION: 98 %

## 2023-05-19 DIAGNOSIS — Z90.49 ACQUIRED ABSENCE OF OTHER SPECIFIED PARTS OF DIGESTIVE TRACT: Chronic | ICD-10-CM

## 2023-05-19 DIAGNOSIS — K40.90 UNILATERAL INGUINAL HERNIA, WITHOUT OBSTRUCTION OR GANGRENE, NOT SPECIFIED AS RECURRENT: ICD-10-CM

## 2023-05-19 DIAGNOSIS — Z01.818 ENCOUNTER FOR OTHER PREPROCEDURAL EXAMINATION: ICD-10-CM

## 2023-05-19 LAB
ALBUMIN SERPL ELPH-MCNC: 4.4 G/DL — SIGNIFICANT CHANGE UP (ref 3.5–5.2)
ALP SERPL-CCNC: 84 U/L — SIGNIFICANT CHANGE UP (ref 30–115)
ALT FLD-CCNC: 25 U/L — SIGNIFICANT CHANGE UP (ref 0–41)
ANION GAP SERPL CALC-SCNC: 14 MMOL/L — SIGNIFICANT CHANGE UP (ref 7–14)
APPEARANCE UR: CLEAR — SIGNIFICANT CHANGE UP
APTT BLD: 30.7 SEC — SIGNIFICANT CHANGE UP (ref 27–39.2)
AST SERPL-CCNC: 21 U/L — SIGNIFICANT CHANGE UP (ref 0–41)
BASOPHILS # BLD AUTO: 0.06 K/UL — SIGNIFICANT CHANGE UP (ref 0–0.2)
BASOPHILS NFR BLD AUTO: 0.7 % — SIGNIFICANT CHANGE UP (ref 0–1)
BILIRUB SERPL-MCNC: 0.3 MG/DL — SIGNIFICANT CHANGE UP (ref 0.2–1.2)
BILIRUB UR-MCNC: NEGATIVE — SIGNIFICANT CHANGE UP
BUN SERPL-MCNC: 11 MG/DL — SIGNIFICANT CHANGE UP (ref 10–20)
CALCIUM SERPL-MCNC: 9.8 MG/DL — SIGNIFICANT CHANGE UP (ref 8.4–10.5)
CHLORIDE SERPL-SCNC: 104 MMOL/L — SIGNIFICANT CHANGE UP (ref 98–110)
CO2 SERPL-SCNC: 27 MMOL/L — SIGNIFICANT CHANGE UP (ref 17–32)
COLOR SPEC: COLORLESS — SIGNIFICANT CHANGE UP
CREAT SERPL-MCNC: 1.3 MG/DL — SIGNIFICANT CHANGE UP (ref 0.7–1.5)
DIFF PNL FLD: NEGATIVE — SIGNIFICANT CHANGE UP
EGFR: 64 ML/MIN/1.73M2 — SIGNIFICANT CHANGE UP
EOSINOPHIL # BLD AUTO: 0.07 K/UL — SIGNIFICANT CHANGE UP (ref 0–0.7)
EOSINOPHIL NFR BLD AUTO: 0.8 % — SIGNIFICANT CHANGE UP (ref 0–8)
GLUCOSE SERPL-MCNC: 76 MG/DL — SIGNIFICANT CHANGE UP (ref 70–99)
GLUCOSE UR QL: NEGATIVE — SIGNIFICANT CHANGE UP
HCT VFR BLD CALC: 42.2 % — SIGNIFICANT CHANGE UP (ref 42–52)
HGB BLD-MCNC: 13.2 G/DL — LOW (ref 14–18)
IMM GRANULOCYTES NFR BLD AUTO: 0.5 % — HIGH (ref 0.1–0.3)
INR BLD: 0.86 RATIO — SIGNIFICANT CHANGE UP (ref 0.65–1.3)
KETONES UR-MCNC: NEGATIVE — SIGNIFICANT CHANGE UP
LEUKOCYTE ESTERASE UR-ACNC: NEGATIVE — SIGNIFICANT CHANGE UP
LYMPHOCYTES # BLD AUTO: 1.87 K/UL — SIGNIFICANT CHANGE UP (ref 1.2–3.4)
LYMPHOCYTES # BLD AUTO: 21.3 % — SIGNIFICANT CHANGE UP (ref 20.5–51.1)
MCHC RBC-ENTMCNC: 28.5 PG — SIGNIFICANT CHANGE UP (ref 27–31)
MCHC RBC-ENTMCNC: 31.3 G/DL — LOW (ref 32–37)
MCV RBC AUTO: 91.1 FL — SIGNIFICANT CHANGE UP (ref 80–94)
MONOCYTES # BLD AUTO: 0.66 K/UL — HIGH (ref 0.1–0.6)
MONOCYTES NFR BLD AUTO: 7.5 % — SIGNIFICANT CHANGE UP (ref 1.7–9.3)
NEUTROPHILS # BLD AUTO: 6.08 K/UL — SIGNIFICANT CHANGE UP (ref 1.4–6.5)
NEUTROPHILS NFR BLD AUTO: 69.2 % — SIGNIFICANT CHANGE UP (ref 42.2–75.2)
NITRITE UR-MCNC: NEGATIVE — SIGNIFICANT CHANGE UP
NRBC # BLD: 0 /100 WBCS — SIGNIFICANT CHANGE UP (ref 0–0)
PH UR: 7 — SIGNIFICANT CHANGE UP (ref 5–8)
PLATELET # BLD AUTO: 205 K/UL — SIGNIFICANT CHANGE UP (ref 130–400)
PMV BLD: 10.4 FL — SIGNIFICANT CHANGE UP (ref 7.4–10.4)
POTASSIUM SERPL-MCNC: 4.4 MMOL/L — SIGNIFICANT CHANGE UP (ref 3.5–5)
POTASSIUM SERPL-SCNC: 4.4 MMOL/L — SIGNIFICANT CHANGE UP (ref 3.5–5)
PROT SERPL-MCNC: 7.1 G/DL — SIGNIFICANT CHANGE UP (ref 6–8)
PROT UR-MCNC: NEGATIVE — SIGNIFICANT CHANGE UP
PROTHROM AB SERPL-ACNC: 9.7 SEC — LOW (ref 9.95–12.87)
RBC # BLD: 4.63 M/UL — LOW (ref 4.7–6.1)
RBC # FLD: 13.6 % — SIGNIFICANT CHANGE UP (ref 11.5–14.5)
SODIUM SERPL-SCNC: 145 MMOL/L — SIGNIFICANT CHANGE UP (ref 135–146)
SP GR SPEC: 1.01 — LOW (ref 1.01–1.03)
UROBILINOGEN FLD QL: SIGNIFICANT CHANGE UP
WBC # BLD: 8.78 K/UL — SIGNIFICANT CHANGE UP (ref 4.8–10.8)
WBC # FLD AUTO: 8.78 K/UL — SIGNIFICANT CHANGE UP (ref 4.8–10.8)

## 2023-05-19 PROCEDURE — 80053 COMPREHEN METABOLIC PANEL: CPT

## 2023-05-19 PROCEDURE — 85610 PROTHROMBIN TIME: CPT

## 2023-05-19 PROCEDURE — 36415 COLL VENOUS BLD VENIPUNCTURE: CPT

## 2023-05-19 PROCEDURE — 93005 ELECTROCARDIOGRAM TRACING: CPT

## 2023-05-19 PROCEDURE — 71046 X-RAY EXAM CHEST 2 VIEWS: CPT | Mod: 26

## 2023-05-19 PROCEDURE — 85730 THROMBOPLASTIN TIME PARTIAL: CPT

## 2023-05-19 PROCEDURE — 71046 X-RAY EXAM CHEST 2 VIEWS: CPT

## 2023-05-19 PROCEDURE — 93010 ELECTROCARDIOGRAM REPORT: CPT

## 2023-05-19 PROCEDURE — 81003 URINALYSIS AUTO W/O SCOPE: CPT

## 2023-05-19 PROCEDURE — 99214 OFFICE O/P EST MOD 30 MIN: CPT | Mod: 25

## 2023-05-19 PROCEDURE — 85025 COMPLETE CBC W/AUTO DIFF WBC: CPT

## 2023-05-19 NOTE — H&P PST ADULT - HISTORY OF PRESENT ILLNESS
PT PRESENTS TO PAST WITH NO SOB, CP, PALPITATIONS, DYSURIA, UTI OR URI AT PRESENT.   PT ABLE TO WALK UP 2-3 FLIGHTS OF STEPS WITH NO SOB.  AS PER THE PT, THIS IS HIS/HER COMPLETE MEDICAL AND SURGICAL HX, INCLUDING MEDICATIONS PRESCRIBED AND OVER THE COUNTER  pt denies any covid s/s, or tested positive in the past  pt advised self quarantine till day of procedure  denies travel outside the [place] in the past 30 days  Anesthesia Alert  NO--Difficult Airway  NO--History of neck surgery or radiation  NO--Limited ROM of neck  NO--History of Malignant hyperthermia  NO--Personal or family history of Pseudocholinesterase deficiency  NO--Prior Anesthesia Complication  NO--Latex Allergy  NO--Loose teeth  NO--History of Rheumatoid Arthritis  NO--ENDY  NO BLEEDING RISK  YES- MS NO MEDICATION -PER PT

## 2023-05-19 NOTE — H&P PST ADULT - NSANTHOSAYNRD_GEN_A_CORE
No. ENDY screening performed.  STOP BANG Legend: 0-2 = LOW Risk; 3-4 = INTERMEDIATE Risk; 5-8 = HIGH Risk

## 2023-05-19 NOTE — H&P PST ADULT - ADDITIONAL PE
AIRWAY CLASS--2  PT DENIES HAVING ANY LOOSE TEETH --  UPPER / LOWER DENTURES- PT ADVISED TO REMOVE DOP Spine appears normal, movement of extremities grossly intact.

## 2023-05-19 NOTE — H&P PST ADULT - REASON FOR ADMISSION
Proceduralist: Ren Medley  Procedure: RIGHT INGUINAL HERNIA REPAIR WITH MESH  Procedure: 60 Minutes  PT STATES--i have had the hernia for several years.  recently the pain has gotten much worse.  the pain is 6/10.  the pain is a pressure, pulling and aching type.  when I stand up the pain is less.

## 2023-05-19 NOTE — H&P PST ADULT - NSICDXPASTMEDICALHX_GEN_ALL_CORE_FT
PAST MEDICAL HISTORY:  HLD (hyperlipidemia)     HTN (hypertension)     Multiple sclerosis     PVD (peripheral vascular disease)

## 2023-05-20 DIAGNOSIS — K40.90 UNILATERAL INGUINAL HERNIA, WITHOUT OBSTRUCTION OR GANGRENE, NOT SPECIFIED AS RECURRENT: ICD-10-CM

## 2023-05-20 DIAGNOSIS — Z01.818 ENCOUNTER FOR OTHER PREPROCEDURAL EXAMINATION: ICD-10-CM

## 2023-06-01 NOTE — ASU PATIENT PROFILE, ADULT - FALL HARM RISK - UNIVERSAL INTERVENTIONS
Bed in lowest position, wheels locked, appropriate side rails in place/Call bell, personal items and telephone in reach/Instruct patient to call for assistance before getting out of bed or chair/Non-slip footwear when patient is out of bed/[place] to call system/Physically safe environment - no spills, clutter or unnecessary equipment/Purposeful Proactive Rounding/Room/bathroom lighting operational, light cord in reach

## 2023-06-02 ENCOUNTER — TRANSCRIPTION ENCOUNTER (OUTPATIENT)
Age: 58
End: 2023-06-02

## 2023-06-02 ENCOUNTER — OUTPATIENT (OUTPATIENT)
Dept: INPATIENT UNIT | Facility: HOSPITAL | Age: 58
LOS: 1 days | Discharge: HOME | End: 2023-06-02
Payer: COMMERCIAL

## 2023-06-02 ENCOUNTER — APPOINTMENT (OUTPATIENT)
Dept: SURGERY | Facility: AMBULATORY SURGERY CENTER | Age: 58
End: 2023-06-02
Payer: COMMERCIAL

## 2023-06-02 VITALS
WEIGHT: 181.88 LBS | SYSTOLIC BLOOD PRESSURE: 164 MMHG | OXYGEN SATURATION: 99 % | DIASTOLIC BLOOD PRESSURE: 77 MMHG | TEMPERATURE: 98 F | HEART RATE: 50 BPM | RESPIRATION RATE: 17 BRPM

## 2023-06-02 VITALS
OXYGEN SATURATION: 99 % | SYSTOLIC BLOOD PRESSURE: 127 MMHG | DIASTOLIC BLOOD PRESSURE: 72 MMHG | HEART RATE: 58 BPM | RESPIRATION RATE: 16 BRPM

## 2023-06-02 DIAGNOSIS — K40.90 UNILATERAL INGUINAL HERNIA, WITHOUT OBSTRUCTION OR GANGRENE, NOT SPECIFIED AS RECURRENT: ICD-10-CM

## 2023-06-02 DIAGNOSIS — Z90.49 ACQUIRED ABSENCE OF OTHER SPECIFIED PARTS OF DIGESTIVE TRACT: Chronic | ICD-10-CM

## 2023-06-02 PROCEDURE — C1781: CPT

## 2023-06-02 PROCEDURE — 64714 REVISE LOW BACK NERVE(S): CPT

## 2023-06-02 PROCEDURE — 49505 PRP I/HERN INIT REDUC >5 YR: CPT | Mod: RT

## 2023-06-02 RX ORDER — CHOLECALCIFEROL (VITAMIN D3) 125 MCG
1 CAPSULE ORAL
Refills: 0 | DISCHARGE

## 2023-06-02 RX ORDER — ATORVASTATIN CALCIUM 80 MG/1
1 TABLET, FILM COATED ORAL
Refills: 0 | DISCHARGE

## 2023-06-02 RX ORDER — MORPHINE SULFATE 50 MG/1
2 CAPSULE, EXTENDED RELEASE ORAL
Refills: 0 | Status: DISCONTINUED | OUTPATIENT
Start: 2023-06-02 | End: 2023-06-02

## 2023-06-02 RX ORDER — METOPROLOL TARTRATE 50 MG
1 TABLET ORAL
Qty: 0 | Refills: 0 | DISCHARGE

## 2023-06-02 RX ORDER — ONDANSETRON 8 MG/1
4 TABLET, FILM COATED ORAL ONCE
Refills: 0 | Status: DISCONTINUED | OUTPATIENT
Start: 2023-06-02 | End: 2023-06-02

## 2023-06-02 RX ORDER — SODIUM CHLORIDE 9 MG/ML
500 INJECTION, SOLUTION INTRAVENOUS
Refills: 0 | Status: DISCONTINUED | OUTPATIENT
Start: 2023-06-02 | End: 2023-06-02

## 2023-06-02 RX ORDER — PANTOPRAZOLE SODIUM 20 MG/1
1 TABLET, DELAYED RELEASE ORAL
Qty: 0 | Refills: 0 | DISCHARGE

## 2023-06-02 RX ORDER — METOPROLOL TARTRATE 50 MG
1 TABLET ORAL
Refills: 0 | DISCHARGE

## 2023-06-02 RX ADMIN — SODIUM CHLORIDE 100 MILLILITER(S): 9 INJECTION, SOLUTION INTRAVENOUS at 09:36

## 2023-06-02 NOTE — BRIEF OPERATIVE NOTE - NSICDXBRIEFPREOP_GEN_ALL_CORE_FT
PRE-OP DIAGNOSIS:  Inguinal hernia, right 02-Jun-2023 07:22:03  Ren Medley  Inguinodynia 02-Jun-2023 08:23:18  Ren Medley

## 2023-06-02 NOTE — BRIEF OPERATIVE NOTE - NSICDXBRIEFPOSTOP_GEN_ALL_CORE_FT
POST-OP DIAGNOSIS:  Inguinal hernia, right 02-Jun-2023 07:22:05  Ren Medley  Inguinodynia 02-Jun-2023 08:23:21  Ren Medley

## 2023-06-02 NOTE — ASU DISCHARGE PLAN (ADULT/PEDIATRIC) - ASU DC SPECIAL INSTRUCTIONSFT
Diet    Eat light on the day of surgery. Nausea and vomiting can occur after anesthesia,   but usually resolve within 24 hours.  Resume normal diet the following day.      Activity    Rest!  No heavy lifting or strenuous activity.    Medications    Ibuprofen (Advil, Motrin), Naprosyn (Aleve) and/or Extra-Strength Tylenol for pain.  Tramadol for severe pain only.  Your prescription was sent electronically to your pharmacy.  Remember, Tramadol is a strong narcotic-like pain reliever which can cause drowsiness, upset stomach and constipation.  It should always be taken with food.  You can use stool softener (Mineral Oil) or laxative (MiraLax or Dulcolax) if constipated.  An antibiotic is given during surgery.  No antibiotic needed at home.   Resume all previous medications.  Resume blood thinners the day after surgery unless told otherwise.    Wound Care    Leave surgical dressing in place.  May shower (dressing is waterproof.)  No pool, ocean, lake, hot-tub or   bath for 3 weeks. If you were given an abdominal binder, please wear it as much as possible (day & night)   for 2 weeks, but you must remove it for showers.  Ice packs to the area intermittently for several days help   with pain and swelling.  Bruising (“black and blue”) is common.  Treatment is…Ice & Rest.       - You will see [facility] Physician Assistant, Tuyet Brown, at your postoperative visit noted below.

## 2023-06-02 NOTE — ASU DISCHARGE PLAN (ADULT/PEDIATRIC) - CARE PROVIDER_API CALL
Ren Medley  Surgery  [address] [place] [id number]  Phone: [phone]  Fax: [phone]  Scheduled Appointment: 06/12/2023 01:20 PM  
Benjamín Doyle

## 2023-06-02 NOTE — BRIEF OPERATIVE NOTE - NSICDXBRIEFPROCEDURE_GEN_ALL_CORE_FT
PROCEDURES:  Repair of right inguinal hernia with mesh 02-Jun-2023 07:22:08  Ren Medley  Neurectomy, inguinal region 02-Jun-2023 08:23:25  Ren Medley

## 2023-06-02 NOTE — CHART NOTE - NSCHARTNOTEFT_GEN_A_CORE
PACU ANESTHESIA ADMISSION NOTE      Procedure: Repair of right inguinal hernia with mesh    Neurectomy, inguinal region      Post op diagnosis:  Inguinal hernia, right    Inguinodynia        ____  Intubated  TV:______       Rate: ______      FiO2: ______    _x___  Patent Airway    _x___  Full return of protective reflexes    _x___  Full recovery from anesthesia / back to baseline     Vitals:   T: 37          R:  18                BP: 100/64                 Sat:   98                P: 61      Mental Status:  __x__ Awake   __x___ Alert   _____ Drowsy   _____ Sedated    Nausea/Vomiting:  _x___ NO  ______Yes,   See Post - Op Orders          Pain Scale (0-10):  __0___    Treatment: ____ None    ____ See Post - Op/PCA Orders    Post - Operative Fluids:   _x___ Oral   ____ See Post - Op Orders    Plan: Discharge:   x____Home       _____Floor     _____Critical Care    _____  Other:_________________    Comments:

## 2023-06-07 DIAGNOSIS — G35 MULTIPLE SCLEROSIS: ICD-10-CM

## 2023-06-07 DIAGNOSIS — G89.29 OTHER CHRONIC PAIN: ICD-10-CM

## 2023-06-07 DIAGNOSIS — I10 ESSENTIAL (PRIMARY) HYPERTENSION: ICD-10-CM

## 2023-06-07 DIAGNOSIS — K40.90 UNILATERAL INGUINAL HERNIA, WITHOUT OBSTRUCTION OR GANGRENE, NOT SPECIFIED AS RECURRENT: ICD-10-CM

## 2023-06-07 DIAGNOSIS — Z87.891 PERSONAL HISTORY OF NICOTINE DEPENDENCE: ICD-10-CM

## 2023-06-12 ENCOUNTER — APPOINTMENT (OUTPATIENT)
Dept: SURGERY | Facility: CLINIC | Age: 58
End: 2023-06-12
Payer: COMMERCIAL

## 2023-06-12 ENCOUNTER — TRANSCRIPTION ENCOUNTER (OUTPATIENT)
Age: 58
End: 2023-06-12

## 2023-06-12 DIAGNOSIS — K40.90 UNILATERAL INGUINAL HERNIA, W/OUT OBSTRUCTION OR GANGRENE, NOT SPECIFIED AS RECURRENT: ICD-10-CM

## 2023-06-12 PROCEDURE — 99024 POSTOP FOLLOW-UP VISIT: CPT

## 2023-06-12 NOTE — ASSESSMENT
[FreeTextEntry1] : GISELE PANTOJA underwent a very large direct right inguinal hernia repair with mesh and right iliohypogastric neurectomy with Dr. Medley on 6/2/23 under local IV sedation without any problems or complications. His wound is clean, dry and intact. There is no evidence of erythema, seroma formation or infection. He is tolerating a diet and having normal bowel movements. He denies any significant postoperative pain or discomfort at this time.\par \par He was counseled and reassured. GISELE was discharged from the office with no specific follow up necessary, but he knows to avoid any heavy lifting or strenuous activity for the next several weeks. \par \par \par \par  independent

## 2023-06-12 NOTE — CONSULT LETTER
[Dear  ___] : Dear  [unfilled], [Courtesy Letter:] : I had the pleasure of seeing your patient, [unfilled], in my office today. [Please see my note below.] : Please see my note below. [Consult Closing:] : Thank you very much for allowing me to participate in the care of this patient.  If you have any questions, please do not hesitate to contact me. [FreeTextEntry3] : Sincerely,\par \par Charley Brown PA-C, DEMOND\par  [DrEusebio  ___] : Dr. WILLARD [DrEusebio ___] : Dr. WILLARD

## 2023-06-30 ENCOUNTER — APPOINTMENT (OUTPATIENT)
Dept: CARDIOLOGY | Facility: CLINIC | Age: 58
End: 2023-06-30

## 2023-07-14 PROBLEM — I73.9 PERIPHERAL VASCULAR DISEASE, UNSPECIFIED: Chronic | Status: ACTIVE | Noted: 2023-05-19

## 2023-07-19 ENCOUNTER — APPOINTMENT (OUTPATIENT)
Dept: CARDIOLOGY | Facility: CLINIC | Age: 58
End: 2023-07-19
Payer: COMMERCIAL

## 2023-07-19 VITALS
DIASTOLIC BLOOD PRESSURE: 80 MMHG | WEIGHT: 182 LBS | BODY MASS INDEX: 25.48 KG/M2 | HEIGHT: 71 IN | SYSTOLIC BLOOD PRESSURE: 134 MMHG | TEMPERATURE: 97.6 F

## 2023-07-19 DIAGNOSIS — I70.213 ATHEROSCLEROSIS OF NATIVE ARTERIES OF EXTREMITIES WITH INTERMITTENT CLAUDICATION, BILATERAL LEGS: ICD-10-CM

## 2023-07-19 DIAGNOSIS — I10 ESSENTIAL (PRIMARY) HYPERTENSION: ICD-10-CM

## 2023-07-19 PROCEDURE — 99214 OFFICE O/P EST MOD 30 MIN: CPT

## 2023-07-19 RX ORDER — ATORVASTATIN CALCIUM 40 MG/1
40 TABLET, FILM COATED ORAL
Qty: 1 | Refills: 3 | Status: ACTIVE | COMMUNITY
Start: 2023-07-19 | End: 1900-01-01

## 2023-07-19 NOTE — ASSESSMENT
[FreeTextEntry1] : 57 Yr M PMH MS, HTN, dyslipidemia presenting for LE pain\par \par PAD involving b/l EIA\par Hx MS, HTN, Smoker and dyslipidemia\par \par - Pt is asymptomatic at this time. Can walk 1/2 miles without symptoms \par - Will elect to do medical therapy at this time\par - Smoking cessation\par - Aspirin 81mg and Atorvastatin 40mg\par - F/U in 6 month or soon if develops symtpoms\par \par \par I, Dr. Umana, personally performed the evaluation and management (E/M) services for this established patient who presents today with (a) new problem(s)/exacerbation of (an) existing condition(s). That E/M includes conducting the clinically appropriate interval history &/or exam, assessing all new/exacerbated conditions, and establishing a new plan of care. Today, my fellow Dr. White, was here to observe &/or participate in the visit & follow plan of care established by me.\par .\par

## 2023-07-19 NOTE — PHYSICAL EXAM
[General Appearance - Well Developed] : well developed [Normal Appearance] : normal appearance [Normal Oral Mucosa] : normal oral mucosa [Respiration, Rhythm And Depth] : normal respiratory rhythm and effort [Auscultation Breath Sounds / Voice Sounds] : lungs were clear to auscultation bilaterally [Heart Rate And Rhythm] : heart rate and rhythm were normal [Heart Sounds] : normal S1 and S2 [Murmurs] : no murmurs present [Bowel Sounds] : normal bowel sounds [Abdomen Soft] : soft [Abnormal Walk] : normal gait [Nail Clubbing] : no clubbing of the fingernails [] : no rash [Cyanosis, Localized] : no localized cyanosis [Oriented To Time, Place, And Person] : oriented to person, place, and time

## 2023-10-23 ENCOUNTER — APPOINTMENT (OUTPATIENT)
Dept: SURGERY | Facility: CLINIC | Age: 58
End: 2023-10-23
Payer: COMMERCIAL

## 2023-10-23 VITALS — BODY MASS INDEX: 26.04 KG/M2 | WEIGHT: 186 LBS | HEIGHT: 71 IN

## 2023-10-23 DIAGNOSIS — T14.8XXA OTHER INJURY OF UNSPECIFIED BODY REGION, INITIAL ENCOUNTER: ICD-10-CM

## 2023-10-23 PROCEDURE — 99213 OFFICE O/P EST LOW 20 MIN: CPT

## 2023-10-23 RX ORDER — TRAMADOL HYDROCHLORIDE 50 MG/1
50 TABLET, COATED ORAL
Qty: 24 | Refills: 0 | Status: COMPLETED | COMMUNITY
Start: 2023-06-02 | End: 2023-06-09

## 2023-11-06 NOTE — ASU PATIENT PROFILE, ADULT - PRO INTERPRETER NEED 2
November 6, 2023     Patient: Darron Bowling   YOB: 1994   Date of Visit: 11/6/2023       To Whom it May Concern:    Shannan Barajas was seen in my clinic on 11/6/2023. She may return to work on 11/7/2023 . If you have any questions or concerns, please don't hesitate to call.          Sincerely,          Maggie Chavez PA-C        CC: No Recipients English

## 2024-08-19 ENCOUNTER — APPOINTMENT (OUTPATIENT)
Dept: CARDIOLOGY | Facility: CLINIC | Age: 59
End: 2024-08-19
Payer: COMMERCIAL

## 2024-08-19 VITALS
WEIGHT: 180 LBS | RESPIRATION RATE: 16 BRPM | HEIGHT: 71 IN | OXYGEN SATURATION: 98 % | SYSTOLIC BLOOD PRESSURE: 132 MMHG | HEART RATE: 68 BPM | DIASTOLIC BLOOD PRESSURE: 80 MMHG | BODY MASS INDEX: 25.2 KG/M2

## 2024-08-19 DIAGNOSIS — I73.9 PERIPHERAL VASCULAR DISEASE, UNSPECIFIED: ICD-10-CM

## 2024-08-19 PROCEDURE — 99214 OFFICE O/P EST MOD 30 MIN: CPT

## 2024-08-19 NOTE — HISTORY OF PRESENT ILLNESS
[FreeTextEntry1] : 59 Yr M PMH MS, HTN, dyslipidemia presenting for f/u LE claudication. Pt presented 11/2022 for b/l LE leg pain worse with exertion better with rest. Pt reports since then symptoms have improved. He is able to walk 1/2 mile without any symptoms. Pt had arterial duplex showing Moderate disease b/l EIA.    === Data reviewed today === Vital signs  4/23 CT aorta with runoff shows severe disease b/l EIA.

## 2024-08-19 NOTE — ASSESSMENT
[FreeTextEntry1] : 57 Yr M PMH MS, HTN, dyslipidemia presenting for LE pain  PAD involving b/l EIA Hx MS, HTN, Smoker and dyslipidemia  - Pt is asymptomatic at this time. Can walk 1/2 miles without symptoms  - Will elect to do medical therapy at this time  Plan: - Smoking cessation - Aspirin 81mg  - Will switch patient from atorvastatin 40 mg to Rosuvastatin 20 mg  - CBC, CMP, A1c and Lipid Profile in 4 weeks - F/U in 6 month or soon if develops symptoms  I, Dr. Umana, personally performed the evaluation and management (E/M) services for this established patient who presents today with (a) new problem(s)/exacerbation of (an) existing condition(s). That E/M includes conducting the clinically appropriate interval history &/or exam, assessing all new/exacerbated conditions, and establishing a new plan of care. Today, my OSVALDO, Dr. Rich Pruitt, was here to observe &/or participate in the visit & follow plan of care established by me.

## 2024-08-19 NOTE — PHYSICAL EXAM
[General Appearance - Well Developed] : well developed [Normal Appearance] : normal appearance [Normal Oral Mucosa] : normal oral mucosa [Respiration, Rhythm And Depth] : normal respiratory rhythm and effort [Auscultation Breath Sounds / Voice Sounds] : lungs were clear to auscultation bilaterally [Heart Rate And Rhythm] : heart rate and rhythm were normal [Heart Sounds] : normal S1 and S2 [Murmurs] : no murmurs present [Bowel Sounds] : normal bowel sounds [Abdomen Soft] : soft [Abnormal Walk] : normal gait [Nail Clubbing] : no clubbing of the fingernails [Cyanosis, Localized] : no localized cyanosis [] : no rash [Oriented To Time, Place, And Person] : oriented to person, place, and time

## 2024-08-19 NOTE — REASON FOR VISIT
[Consultation] : a consultation regarding [FreeTextEntry1] : 59 Yr M PMH MS, HTN, dyslipidemia presenting for f/u LE claudication. Pt presented 11/2022 for b/l LE leg pain worse with exertion better with rest. Pt reports since then symptoms have improved. He is able to walk 1/2 mile without any symptoms. Pt had arterial duplex showing Moderate disease b/l EIA.   === Data reviewed today === Vital signs 4/23 CT aorta with runoff shows severe disease b/l EIA.  2/23:  R TYESHA 0.91, R TBI 0.73 L TYESHA 0.82, L TBI 0.64  REIA >75% stenosis FRANKLYN>75% stenosis

## 2024-08-19 NOTE — PHYSICAL EXAM
[General Appearance - Well Developed] : well developed [Normal Appearance] : normal appearance [Normal Oral Mucosa] : normal oral mucosa [Respiration, Rhythm And Depth] : normal respiratory rhythm and effort [Auscultation Breath Sounds / Voice Sounds] : lungs were clear to auscultation bilaterally [Heart Rate And Rhythm] : heart rate and rhythm were normal [Murmurs] : no murmurs present [Heart Sounds] : normal S1 and S2 [Bowel Sounds] : normal bowel sounds [Abdomen Soft] : soft [Abnormal Walk] : normal gait [Nail Clubbing] : no clubbing of the fingernails [Cyanosis, Localized] : no localized cyanosis [] : no rash [Oriented To Time, Place, And Person] : oriented to person, place, and time

## 2024-08-23 RX ORDER — ROSUVASTATIN CALCIUM 20 MG/1
20 TABLET, FILM COATED ORAL DAILY
Qty: 30 | Refills: 6 | Status: ACTIVE | COMMUNITY
Start: 2024-08-19 | End: 1900-01-01

## 2024-09-11 ENCOUNTER — RX RENEWAL (OUTPATIENT)
Age: 59
End: 2024-09-11

## 2024-11-18 ENCOUNTER — LABORATORY RESULT (OUTPATIENT)
Age: 59
End: 2024-11-18

## 2024-11-18 ENCOUNTER — OUTPATIENT (OUTPATIENT)
Dept: OUTPATIENT SERVICES | Facility: HOSPITAL | Age: 59
LOS: 1 days | End: 2024-11-18
Payer: COMMERCIAL

## 2024-11-18 DIAGNOSIS — Z00.00 ENCOUNTER FOR GENERAL ADULT MEDICAL EXAMINATION WITHOUT ABNORMAL FINDINGS: ICD-10-CM

## 2024-11-18 DIAGNOSIS — Z90.49 ACQUIRED ABSENCE OF OTHER SPECIFIED PARTS OF DIGESTIVE TRACT: Chronic | ICD-10-CM

## 2024-11-18 DIAGNOSIS — I10 ESSENTIAL (PRIMARY) HYPERTENSION: ICD-10-CM

## 2024-11-18 PROCEDURE — 83036 HEMOGLOBIN GLYCOSYLATED A1C: CPT

## 2024-11-18 PROCEDURE — 84153 ASSAY OF PSA TOTAL: CPT

## 2024-11-18 PROCEDURE — 80061 LIPID PANEL: CPT

## 2024-11-18 PROCEDURE — 86803 HEPATITIS C AB TEST: CPT

## 2024-11-18 PROCEDURE — 85027 COMPLETE CBC AUTOMATED: CPT

## 2024-11-18 PROCEDURE — 84443 ASSAY THYROID STIM HORMONE: CPT

## 2024-11-18 PROCEDURE — 80053 COMPREHEN METABOLIC PANEL: CPT

## 2024-11-19 DIAGNOSIS — I10 ESSENTIAL (PRIMARY) HYPERTENSION: ICD-10-CM

## 2024-11-19 DIAGNOSIS — Z00.00 ENCOUNTER FOR GENERAL ADULT MEDICAL EXAMINATION WITHOUT ABNORMAL FINDINGS: ICD-10-CM

## 2024-12-16 NOTE — HISTORY OF PRESENT ILLNESS
[FreeTextEntry1] : 58 Yr M PMH MS, HTN, dyslipidemia presenting for f/u LE claudication. Pt presented 11/2022 for b/l LE leg pain worse with exertion better with rest. Pt reports since then symptoms have improved. He is able to walk 1/2 mile without any symptoms. Pt had arterial duplex showing Moderate disease b/l EIA. CT aorta with runoff shows severe disease b/l EIA.
none

## 2025-02-10 ENCOUNTER — APPOINTMENT (OUTPATIENT)
Dept: CARDIOLOGY | Facility: CLINIC | Age: 60
End: 2025-02-10
Payer: COMMERCIAL

## 2025-02-10 VITALS
WEIGHT: 184 LBS | SYSTOLIC BLOOD PRESSURE: 148 MMHG | DIASTOLIC BLOOD PRESSURE: 92 MMHG | HEIGHT: 71 IN | HEART RATE: 45 BPM | BODY MASS INDEX: 25.76 KG/M2

## 2025-02-10 DIAGNOSIS — R20.0 ANESTHESIA OF SKIN: ICD-10-CM

## 2025-02-10 DIAGNOSIS — Z71.6 TOBACCO ABUSE COUNSELING: ICD-10-CM

## 2025-02-10 DIAGNOSIS — I10 ESSENTIAL (PRIMARY) HYPERTENSION: ICD-10-CM

## 2025-02-10 DIAGNOSIS — G35 MULTIPLE SCLEROSIS: ICD-10-CM

## 2025-02-10 DIAGNOSIS — I73.9 PERIPHERAL VASCULAR DISEASE, UNSPECIFIED: ICD-10-CM

## 2025-02-10 DIAGNOSIS — I70.213 ATHEROSCLEROSIS OF NATIVE ARTERIES OF EXTREMITIES WITH INTERMITTENT CLAUDICATION, BILATERAL LEGS: ICD-10-CM

## 2025-02-10 PROCEDURE — 99214 OFFICE O/P EST MOD 30 MIN: CPT | Mod: 25

## 2025-02-10 PROCEDURE — 93925 LOWER EXTREMITY STUDY: CPT

## 2025-02-10 PROCEDURE — 99406 BEHAV CHNG SMOKING 3-10 MIN: CPT

## 2025-02-10 PROCEDURE — 93923 UPR/LXTR ART STDY 3+ LVLS: CPT

## 2025-02-21 NOTE — ED ADULT NURSE NOTE - CADM POA PRESS ULCER
HPI    Here for annual eye exam     Eye meds:  None    66 year old male states he is sees well with current glasses. Denies   flashes, floaters or diplopia. Denies itchy or scratchy eyes. Denies   ocular pain. Voices no concerns and doesn't want to changes his glasses   Last edited by Marleni Donald on 2/21/2025  8:16 AM.            Assessment /Plan     For exam results, see Encounter Report.    Type 2 diabetes mellitus with hyperglycemia, without long-term current use of insulin  Type 2 diabetes mellitus without ophthalmic manifestations    Dry eye syndrome, bilateral              Use art tears BID/PRN     Brain mass  Visual field defect of right eye  2017 S/P Gamma Knife Radiation procedure 02/2017 by Dr. Robert MD       Sixth nerve palsy of right eye  Esotropia  Presbyopia              Continue with prism in specs              Updated Rx today    Dry eye syndrome, bilateral  Conjunctivitis, allergic, bilateral    Use Pataday XS QAM    RTC 1 year                                   
No

## 2025-03-27 ENCOUNTER — APPOINTMENT (OUTPATIENT)
Dept: PULMONOLOGY | Facility: CLINIC | Age: 60
End: 2025-03-27

## 2025-06-25 NOTE — ASU PATIENT PROFILE, ADULT - NS PRO INFO GIVEN TO
Danielle Robles presents to Urgent Care Patient arriving: alone with complaint of right ear pain and right jaw pain and swelling.   Onset one month     Patient masked: No  Clinical masked: No    Can leave detailed message on mobile phone:    Mobile [phone]        patient